# Patient Record
Sex: FEMALE | Race: BLACK OR AFRICAN AMERICAN | NOT HISPANIC OR LATINO
[De-identification: names, ages, dates, MRNs, and addresses within clinical notes are randomized per-mention and may not be internally consistent; named-entity substitution may affect disease eponyms.]

---

## 2017-02-27 PROBLEM — L20.89 OTHER ATOPIC DERMATITIS: Status: ACTIVE | Noted: 2017-02-27

## 2017-03-14 PROBLEM — J06.9 ACUTE UPPER RESPIRATORY INFECTION, UNSPECIFIED: Status: ACTIVE | Noted: 2017-03-14

## 2017-03-14 PROBLEM — J45.909 UNSPECIFIED ASTHMA, UNCOMPLICATED: Status: ACTIVE | Noted: 2017-03-14

## 2017-04-11 PROBLEM — G62.9 POLYNEUROPATHY, UNSPECIFIED: Status: ACTIVE | Noted: 2017-04-11

## 2017-10-31 PROBLEM — R05 COUGH: Status: ACTIVE | Noted: 2017-10-31

## 2017-10-31 PROBLEM — Z23 ENCOUNTER FOR IMMUNIZATION: Status: ACTIVE | Noted: 2017-10-31

## 2017-10-31 PROBLEM — G56.01 CARPAL TUNNEL SYNDROME, RIGHT UPPER LIMB: Status: ACTIVE | Noted: 2017-10-31

## 2017-10-31 PROBLEM — E11.9 TYPE 2 DIABETES MELLITUS WITHOUT COMPLICATIONS: Status: ACTIVE | Noted: 2017-10-31

## 2017-11-06 PROBLEM — M51.26 OTHER INTERVERTEBRAL DISC DISPLACEMENT, LUMBAR REGION: Status: ACTIVE | Noted: 2017-11-06

## 2017-12-29 PROBLEM — R32 UNSPECIFIED URINARY INCONTINENCE: Status: ACTIVE | Noted: 2017-12-29

## 2017-12-29 PROBLEM — R35.0 FREQUENCY OF MICTURITION: Status: ACTIVE | Noted: 2017-12-29

## 2018-02-27 PROBLEM — R63.5 ABNORMAL WEIGHT GAIN: Status: ACTIVE | Noted: 2018-02-27

## 2018-02-27 PROBLEM — G47.33 OBSTRUCTIVE SLEEP APNEA (ADULT) (PEDIATRIC): Status: ACTIVE | Noted: 2018-02-27

## 2018-04-20 PROBLEM — R94.5 ABNORMAL RESULTS OF LIVER FUNCTION STUDIES: Status: ACTIVE | Noted: 2018-04-20

## 2018-04-20 PROBLEM — E78.2 MIXED HYPERLIPIDEMIA: Status: ACTIVE | Noted: 2018-04-20

## 2018-06-27 PROBLEM — M25.561 PAIN IN RIGHT KNEE: Status: ACTIVE | Noted: 2018-06-27

## 2018-08-12 PROBLEM — G47.09 OTHER INSOMNIA: Status: ACTIVE | Noted: 2018-08-12

## 2018-11-09 PROBLEM — S76.212D: Status: ACTIVE | Noted: 2018-11-09

## 2018-11-09 PROBLEM — E66.01 MORBID (SEVERE) OBESITY DUE TO EXCESS CALORIES: Status: ACTIVE | Noted: 2018-11-09

## 2019-02-27 PROBLEM — E53.8 DEFICIENCY OF OTHER SPECIFIED B GROUP VITAMINS: Status: ACTIVE | Noted: 2019-02-27

## 2019-05-22 PROBLEM — Z51.81 ENCOUNTER FOR THERAPEUTIC DRUG LEVEL MONITORING: Status: ACTIVE | Noted: 2019-05-22

## 2019-10-21 PROBLEM — M54.2 CERVICALGIA: Status: ACTIVE | Noted: 2019-10-21

## 2020-03-04 PROBLEM — J18.9 PNEUMONIA, UNSPECIFIED ORGANISM: Status: ACTIVE | Noted: 2020-03-04

## 2020-03-04 PROBLEM — J02.0 STREPTOCOCCAL PHARYNGITIS: Status: ACTIVE | Noted: 2020-03-04

## 2020-03-24 PROBLEM — R22.1 LOCALIZED SWELLING, MASS AND LUMP, NECK: Status: ACTIVE | Noted: 2020-03-24

## 2020-03-24 PROBLEM — E11.9 TYPE 2 DIABETES MELLITUS WITHOUT COMPLICATIONS: Status: ACTIVE | Noted: 2020-03-24

## 2020-06-24 ENCOUNTER — RX ONLY (OUTPATIENT)
Age: 65
Setting detail: RX ONLY
End: 2020-06-24

## 2020-08-25 ENCOUNTER — RX ONLY (OUTPATIENT)
Age: 65
Setting detail: RX ONLY
End: 2020-08-25

## 2020-10-14 ENCOUNTER — RX ONLY (OUTPATIENT)
Age: 65
Setting detail: RX ONLY
End: 2020-10-14

## 2020-12-29 ENCOUNTER — RX ONLY (OUTPATIENT)
Age: 65
Setting detail: RX ONLY
End: 2020-12-29

## 2021-03-22 ENCOUNTER — RX ONLY (OUTPATIENT)
Age: 66
Setting detail: RX ONLY
End: 2021-03-22

## 2021-07-07 ENCOUNTER — APPOINTMENT (RX ONLY)
Dept: URBAN - METROPOLITAN AREA CLINIC 349 | Facility: CLINIC | Age: 66
Setting detail: DERMATOLOGY
End: 2021-07-07

## 2021-07-07 DIAGNOSIS — L28.1 PRURIGO NODULARIS: ICD-10-CM

## 2021-07-07 DIAGNOSIS — L30.4 ERYTHEMA INTERTRIGO: ICD-10-CM

## 2021-07-07 DIAGNOSIS — B35.3 TINEA PEDIS: ICD-10-CM

## 2021-07-07 DIAGNOSIS — L43.8 OTHER LICHEN PLANUS: ICD-10-CM

## 2021-07-07 DIAGNOSIS — L65.9 NONSCARRING HAIR LOSS, UNSPECIFIED: ICD-10-CM

## 2021-07-07 PROCEDURE — 99203 OFFICE O/P NEW LOW 30 MIN: CPT

## 2021-07-07 PROCEDURE — ? PRESCRIPTION

## 2021-07-07 PROCEDURE — ? OTHER

## 2021-07-07 PROCEDURE — ? PRESCRIPTION MEDICATION MANAGEMENT

## 2021-07-07 PROCEDURE — ? COUNSELING

## 2021-07-07 RX ORDER — KETOCONAZOLE 20 MG/G
CREAM TOPICAL
Qty: 1 | Refills: 3 | Status: ERX | COMMUNITY
Start: 2021-07-07

## 2021-07-07 RX ORDER — HALOBETASOL PROPIONATE AND TAZAROTENE .1; .45 MG/G; MG/G
LOTION TOPICAL
Qty: 1 | Refills: 4 | Status: ERX | COMMUNITY
Start: 2021-07-07

## 2021-07-07 RX ADMIN — KETOCONAZOLE: 20 CREAM TOPICAL at 00:00

## 2021-07-07 RX ADMIN — HALOBETASOL PROPIONATE AND TAZAROTENE: .1; .45 LOTION TOPICAL at 00:00

## 2021-07-07 ASSESSMENT — LOCATION DETAILED DESCRIPTION DERM
LOCATION DETAILED: LEFT PROXIMAL PRETIBIAL REGION
LOCATION DETAILED: RIGHT BUTTOCK
LOCATION DETAILED: LEFT DORSAL FOOT
LOCATION DETAILED: LEFT DISTAL POSTERIOR UPPER ARM
LOCATION DETAILED: LEFT DISTAL PRETIBIAL REGION
LOCATION DETAILED: LEFT MEDIAL FRONTAL SCALP
LOCATION DETAILED: RIGHT DISTAL POSTERIOR UPPER ARM
LOCATION DETAILED: LEFT ACHILLES SKIN
LOCATION DETAILED: RIGHT DORSAL FOOT
LOCATION DETAILED: RIGHT DISTAL PRETIBIAL REGION
LOCATION DETAILED: RIGHT INFRAMAMMARY CREASE (INNER QUADRANT)

## 2021-07-07 ASSESSMENT — LOCATION SIMPLE DESCRIPTION DERM
LOCATION SIMPLE: RIGHT BUTTOCK
LOCATION SIMPLE: RIGHT POSTERIOR UPPER ARM
LOCATION SIMPLE: RIGHT BREAST
LOCATION SIMPLE: LEFT POSTERIOR UPPER ARM
LOCATION SIMPLE: RIGHT PRETIBIAL REGION
LOCATION SIMPLE: RIGHT FOOT
LOCATION SIMPLE: LEFT FOOT
LOCATION SIMPLE: LEFT PRETIBIAL REGION
LOCATION SIMPLE: LEFT ACHILLES SKIN
LOCATION SIMPLE: LEFT SCALP

## 2021-07-07 ASSESSMENT — LOCATION ZONE DERM
LOCATION ZONE: TRUNK
LOCATION ZONE: LEG
LOCATION ZONE: FEET
LOCATION ZONE: SCALP
LOCATION ZONE: ARM

## 2021-07-07 NOTE — PROCEDURE: PRESCRIPTION MEDICATION MANAGEMENT
Samples Given: Duobrii
Render In Strict Bullet Format?: No
Initiate Treatment: Ketoconazole cream apply to feet twice daily until resolved
Detail Level: Zone
Initiate Treatment: Ketoconazole cream apply twice daily under breast for two weeks (prescription under tinea pedis)
Initiate Treatment: Duobrii apply once daily to arms and buttocks as needed.

## 2021-07-07 NOTE — HPI: SKIN LESIONS
How Severe Is Your Skin Lesion?: moderate
Have Your Skin Lesions Been Treated?: not been treated
Is This A New Presentation, Or A Follow-Up?: Skin Lesion
Additional History: Patient is here for lichen planus on her arms, legs and butt. She was previously seeing OMAYRA Radford for treatment. She had a biopsy done and it states it was lichen planus.

## 2021-07-07 NOTE — PROCEDURE: OTHER
Render Risk Assessment In Note?: yes
Note Text (......Xxx Chief Complaint.): This diagnosis correlates with the
Detail Level: Zone
Other (Free Text): Not present today
Other (Free Text): Nothing present today. Will request records.

## 2021-07-07 NOTE — HPI: HAIR LOSS (ALOPECIA AREATA)
How Severe Is It?: moderate
Is This A New Presentation, Or A Follow-Up?: Hair Loss
Additional History: Patient states she was getting intralesional injections in her scalp for hairloss

## 2021-07-13 ENCOUNTER — RX ONLY (OUTPATIENT)
Age: 66
Setting detail: RX ONLY
End: 2021-07-13

## 2021-07-13 RX ORDER — FLUTICASONE PROPIONATE 0.05 MG/G
OINTMENT TOPICAL
Qty: 1 | Refills: 1 | Status: ERX | COMMUNITY
Start: 2021-07-13

## 2021-10-13 ENCOUNTER — APPOINTMENT (RX ONLY)
Dept: URBAN - METROPOLITAN AREA CLINIC 349 | Facility: CLINIC | Age: 66
Setting detail: DERMATOLOGY
End: 2021-10-13

## 2021-10-13 ENCOUNTER — RX ONLY (OUTPATIENT)
Age: 66
Setting detail: RX ONLY
End: 2021-10-13

## 2021-10-13 DIAGNOSIS — L43.8 OTHER LICHEN PLANUS: ICD-10-CM

## 2021-10-13 DIAGNOSIS — L30.4 ERYTHEMA INTERTRIGO: ICD-10-CM | Status: RESOLVING

## 2021-10-13 DIAGNOSIS — L28.1 PRURIGO NODULARIS: ICD-10-CM | Status: IMPROVED

## 2021-10-13 DIAGNOSIS — B35.3 TINEA PEDIS: ICD-10-CM | Status: IMPROVED

## 2021-10-13 PROCEDURE — ? PRESCRIPTION MEDICATION MANAGEMENT

## 2021-10-13 PROCEDURE — ? OTHER

## 2021-10-13 PROCEDURE — 99213 OFFICE O/P EST LOW 20 MIN: CPT

## 2021-10-13 PROCEDURE — ? COUNSELING

## 2021-10-13 RX ORDER — FLUTICASONE PROPIONATE 0.05 MG/G
OINTMENT TOPICAL
Qty: 60 | Refills: 5 | Status: ERX

## 2021-10-13 RX ORDER — KETOCONAZOLE 20 MG/G
CREAM TOPICAL
Qty: 60 | Refills: 5 | Status: ERX | COMMUNITY
Start: 2021-10-13

## 2021-10-13 ASSESSMENT — LOCATION DETAILED DESCRIPTION DERM
LOCATION DETAILED: LEFT DORSAL FOOT
LOCATION DETAILED: LEFT ACHILLES SKIN
LOCATION DETAILED: LEFT DISTAL POSTERIOR UPPER ARM
LOCATION DETAILED: RIGHT DORSAL FOOT
LOCATION DETAILED: RIGHT DISTAL POSTERIOR UPPER ARM
LOCATION DETAILED: RIGHT DISTAL PRETIBIAL REGION
LOCATION DETAILED: RIGHT INFRAMAMMARY CREASE (INNER QUADRANT)
LOCATION DETAILED: LEFT PROXIMAL PRETIBIAL REGION
LOCATION DETAILED: RIGHT BUTTOCK
LOCATION DETAILED: LEFT DISTAL PRETIBIAL REGION

## 2021-10-13 ASSESSMENT — LOCATION SIMPLE DESCRIPTION DERM
LOCATION SIMPLE: LEFT FOOT
LOCATION SIMPLE: LEFT PRETIBIAL REGION
LOCATION SIMPLE: LEFT ACHILLES SKIN
LOCATION SIMPLE: RIGHT PRETIBIAL REGION
LOCATION SIMPLE: RIGHT FOOT
LOCATION SIMPLE: RIGHT BUTTOCK
LOCATION SIMPLE: RIGHT POSTERIOR UPPER ARM
LOCATION SIMPLE: RIGHT BREAST
LOCATION SIMPLE: LEFT POSTERIOR UPPER ARM

## 2021-10-13 ASSESSMENT — LOCATION ZONE DERM
LOCATION ZONE: FEET
LOCATION ZONE: LEG
LOCATION ZONE: ARM
LOCATION ZONE: TRUNK

## 2021-10-13 NOTE — PROCEDURE: PRESCRIPTION MEDICATION MANAGEMENT
Continue Regimen: Ketoconazole cream apply to feet twice daily until resolved.
Render In Strict Bullet Format?: No
Detail Level: Zone
Continue Regimen: Fluticasone oint apply twice daily for two weeks

## 2021-10-13 NOTE — PROCEDURE: OTHER
Note Text (......Xxx Chief Complaint.): This diagnosis correlates with the
Other (Free Text): Patient states she isn’t consistent with applying the medication. Advised patient to call us if she develops a new lesion.
Detail Level: Zone
Other (Free Text): Patient states she has a hard time getting to the bathroom at night and often urinates on her feet. She states the cream is helping but the rash is most likely a result from the incidents
Render Risk Assessment In Note?: yes

## 2023-01-30 ENCOUNTER — PREP FOR PROCEDURE (OUTPATIENT)
Dept: ADMINISTRATIVE | Age: 68
End: 2023-01-30

## 2023-02-15 RX ORDER — SODIUM CHLORIDE 9 MG/ML
INJECTION, SOLUTION INTRAVENOUS PRN
Status: CANCELLED | OUTPATIENT
Start: 2023-02-15

## 2023-02-15 RX ORDER — SODIUM CHLORIDE 0.9 % (FLUSH) 0.9 %
5-40 SYRINGE (ML) INJECTION EVERY 12 HOURS SCHEDULED
Status: CANCELLED | OUTPATIENT
Start: 2023-02-15

## 2023-02-15 RX ORDER — SODIUM CHLORIDE 0.9 % (FLUSH) 0.9 %
5-40 SYRINGE (ML) INJECTION PRN
Status: CANCELLED | OUTPATIENT
Start: 2023-02-15

## 2023-02-17 RX ORDER — OMEPRAZOLE 20 MG/1
CAPSULE, DELAYED RELEASE ORAL DAILY
COMMUNITY

## 2023-02-17 RX ORDER — TRAMADOL HYDROCHLORIDE 50 MG/1
50 TABLET ORAL EVERY 6 HOURS PRN
COMMUNITY

## 2023-02-17 RX ORDER — DOXEPIN HYDROCHLORIDE 10 MG/1
10 CAPSULE ORAL NIGHTLY
COMMUNITY

## 2023-02-17 RX ORDER — METOPROLOL SUCCINATE 25 MG/1
25 TABLET, EXTENDED RELEASE ORAL DAILY
COMMUNITY

## 2023-02-17 RX ORDER — HYDROCHLOROTHIAZIDE 25 MG/1
25 TABLET ORAL DAILY
COMMUNITY

## 2023-02-17 RX ORDER — DULAGLUTIDE 4.5 MG/.5ML
4.5 INJECTION, SOLUTION SUBCUTANEOUS WEEKLY
COMMUNITY
Start: 2022-09-01

## 2023-02-17 RX ORDER — TELMISARTAN 40 MG/1
40 TABLET ORAL DAILY
COMMUNITY

## 2023-02-17 NOTE — PERIOP NOTE
Patient verified name, , and procedure. Type: 1a; abbreviated assessment per anesthesia guidelines    Labs per anesthesia: POC glucose DOS    Instructed pt that they will be notified the day before their procedure by the GI Lab for time of arrival if their procedure is [facility] and Pre-op for [place] cases. Arrival times should be called by 5 pm. If no phone is received the patient should contact their respective hospital. The GI lab telephone number is [phone] and ES Pre-op is [phone]. Follow diet and prep instructions per office including NPO status. If patient has NOT received instructions from office patient is advised to call surgeon office, verbalizes understanding. Bath or shower the night before and the am of surgery with non-moisturizing soap. No lotions, oils, powders, cologne on skin. No make up, eye make up or jewelry. Wear loose fitting comfortable, clean clothing. Must have adult present in building the entire time . Medications for the day of procedure metoprolol, omeprazole. May take tramadol if needed, patient to hold : Do not take telmisartan or HCTZ on the morning of procedure per anesthesia guidelines. The following discharge instructions reviewed with patient: medication given during procedure may cause drowsiness for several hours, therefore, do not drive or operate machinery for remainder of the day. You may not drink alcohol on the day of your procedure, please resume regular diet and activity unless otherwise directed. You may experience abdominal distention for several hours that is relieved by the passage of gas. Contact your physician if you have any of the following: fever or chills, severe abdominal pain or excessive amount of bleeding or a large amount when having a bowel movement.  Occasional specks of blood with bowel movement would not be unusual.

## 2023-02-22 NOTE — PERIOP NOTE
Return call from message on PAT line. Patient stated she takes trulicity on Wednesday. Patient instructed it was ok to take the Trulicity today and if  you feel symptoms of low blood sugar while fasting Friday morning, check level. If low, drink only 4 ounces of a clear, sugary liquid and call pre-op at [phone]. (Anesthesia protocol). Patient verbalized understanding.

## 2023-02-23 NOTE — H&P
Preoperative H&P    Patient is followed by Dr. Suleiman Orozco. See his clinic note below for full details. She is here today for an EGD (for evaluation of gastric polyp with LGD in 2011) and Surveillance Colonoscopy (for evaluation of history of colon polyps). BP (!) 184/91   Pulse (!) 48   Temp 98.8 °F (37.1 °C) (Oral)   Resp 16   Ht 5' 5\" (1.651 m)   Wt 262 lb (118.8 kg)   SpO2 100%   BMI 43.60 kg/m²   GEN: Lying in bed in NAD  RESP: comfortable on room air  CV: RRR  ABD: soft, NT, ND, obese  NEURO: awake and interactive  PSYCH: normal mood    Endoscopy and risks explained to the patient. Risks including reaction to sedation, cardiopulmonary events, infection, bleeding, perforation, requirement for surgery if complications should occur, death were explained to patient who expressed understanding and agreed to proceed with endoscopy. Sergey Morrell MD   [facility]                          Patient:   Stanley Chamberlain  YOB: 1955   Date:                        01/30/2023 9:30 AM   Visit Type:                  Consult  Provider:   Umesh Hernandez MD  Referring Provider:  Kristina Suero MD [address] [place] [address]  Primary Care Provider: Kristina Suero MD [address] [place] [address]    This 79year old  patient was referred by Kristina Suero MD.  This 79year old female presents for elevated LFTs and abnormal liver ultrasound. History of Present Illness:  1.  elevated LFTs, abnormal liver ultrasound   Ms. Carmen Faria is a 79year old female who returns to the office today for evaluation of elevated LFTs and abnormal ultrasound, referred by Dr. Kristina Suero. She was last seen in our office by TIFFANIE Pizarro in March 2019 for history of colon polyps, family history of colon cancer in sister, gastroesophageal reflux disease and history of gastric polyps.   EGD and Colonoscopy were performed by me at [facility] on 4/15/19: small HH, mild gastritis (bxs c/w chronic gastritis and prominent reactive gastropathy), two small gastric polyps; small IH, 3mm hyperplastic polyp in the rectum. A 3 year recall for EGD and Colonoscopy were recommended. She was previously seen by me in 2016 at which time she was treated with Ardath Delay for hepatitis-C, genotype 1a with F2 Fibrosis with incomplete clearance and relapsed after stopping treatment. Weight loss to reduce fatty liver and follow-up to discuss re-treatment was recommended but patient was lost to follow-up. I have reviewed Dr. Arcadio Draper' office note dated 11/25/22. Labs obtained on 9/23/22 included CBC and CMP notable for glucose 127 (H), AST 50 (H), ALT 37 (H). Prior CMP on 9/1/22 with AST 48 (H), ALT 36 (H). Abdominal sonogram on 10/21/22 showed mild diffuse coarsening of hepatic echotexture similar to prior exam.    Patient is accompanied by her  for today's visit. Her weight is down 78lbs since last visit, per our scales. She states that she has recently completed breast cancer treatment with Dr. Glo Damon; states she was in the middle of treatment at the time of her EGD and Colonoscopy recall that she received last year. She reports that her bowel habits currently tend towards diarrhea. She notes some rectal soreness when she wipes but denies any rectal bleeding. She denies any abdominal pain, nausea, vomiting, jaundice, pruritus or changes in the color of urine or stools. Patient denies any other significant interval changes in her health history or on review of systems. She complains of persistent back pain. She reports that she is followed by Dermatology for  Lichen planus. She states that her sugars are well-controlled since she has been treated with Trulicity. Records in [facility] were reviewed to include Dr. Pham Laura note dated 1/27/23.   She was diagnosed with grade 2, stage 1B moderately differentiated invasive ductal carcinoma in the left breast - inner lower quadrant - lumpectomy by Dr. Chandler Arora on 11/8/21 with negative sentinel nodes - triple negative treated with 4 cycles of Taxotere and cyclophosphamide followed by radiation therapy without any evidence of recurrence. Labs on 1/27/23 showed normal CBC with platelet count of 825 and unremarkable CMP except for elevated AST of 55 and high normal ALT of 51 with albumin of 3.0. Past Medical/Surgical History:   (Detailed)  Disease/disorder Onset Date Management Date Comments     multiple orthopedic surgeries     chr hep c genotype 1a  S/P Harvoni for 12 weeks with relapse     lichen planus       Diabetes mellitus       Hypertension       sleep apnea       GERD       allergies       hyperchloesterolemia       morbid obesity         1973 colostomy after perforation from  IUD. Misael Le colostomy later reversed       hysterectomy /BSO       Tonsillectomy       Additional Medical History  Hepatitis C Genotype 1a. Liver biopsy 2003- stage I, grade 2. F2-F4 FibroSpect 9/10/15; Harvoni therapy for 12 weeks starting 3/28/16 with response but relapse after conclusion of therapy. F2 by FibroSure 7/28/16;   10/12/16 HCV GenoSure NS3/4a No VICKY -No Q80K. NS5a Multiple VICKY detected with possible resistance to DCV, EBR, LDV, OBV and LISA   Lichen planus requiring Cellcept  Diabetes mellitus insulin-dependent, controlled  Hypertension  Sleep apnea  GERD - controlled  Allergies  Hypercholesterolemia  Morbid Obesity  US 2007- adenomyosis of gallbladder, HM, [place]. Echocardiogram & nuclear stress test negative 2007  Breast cancer    Additional Surgical History  1973 colostomy after perforation from IUD. Colostomy later reversed.   Hysterectomy/BSO  Right lumpectomy  Left lumpectomy for breast cancer Dr. Chandler Arora 11/2021  Multiple orthopedic surgeries    Procedure History  2002 EGD- hiatal hernia, mojgan positive gastritis  2004 colonoscopy- hemorrhoids  EGD 8/29/11- gastric polyp with low grade glandular dysplasia with intestinal metaplasia (not completely removed), [facility]  [place] [facility]- small internal hemorrhoids, adenomatous and hyperplastic polyps. 3 year recall. EGD 11 - with polypectomy, hp gastric polyp with int metaplasia and focal low grade dysplasia  EGD and Colon 5/26/15 hiatal hernia, gastritis, gastric polyp, colon polyps( TA and Hp), int. hemorrhoids  EGD/colonoscopy 4/15/19: small HH, mild gastritis (bxs c/w chronic gastritis and prominent reactive gastropathy), two small gastric polyps (non-neoplastic); small IH, 3mm hyperplastic polyp in the rectum. Family History:  (Detailed)  Relationship Family Member Name  Age at Death Condition Onset Age Cause of Death       No family history of Colon polyps  N   Brother  Y  Liver cancer  Y   Brother  Y    N   Father  Y  MI  Y   Father  Y    N   Mother  Y    N   Mother  Y  MI  Y   Sister  Y    N   Sister  N  Cancer, colon 40 N   Sister  Y  Uterine cancer  Y   Sister  Y  Ovarian cancer  Y     Additional Family History  Sister with pancreatic cancer. Social History:  (Detailed)  Previously worked for [employer]. Now disabled. Tobacco use reviewed. Preferred language is [place]. Education/Employment/Occupation:  Employment History Status Retired Restrictions   [employer]  disabled     MARITAL STATUS/FAMILY/SOCIAL SUPPORT  Currently legally . CHILDREN  Does not have children. Tobacco use status: Never smoked tobacco.  Smoking status: Never smoker. SMOKING STATUS  Type Smoking Status Usage Per Day Years Used Total Pack Years    Never smoker        ALCOHOL  There is a history of alcohol use, but no current usage. consumed occasionally. Last alcoholic drink was recently. CAFFEINE  The patient uses caffeine: soda and coffee. - 1 cup a day. HOME ENVIRONMENT/SAFETY  The patient has fallen 1 times in the last year. COMMENTS  No history of drug abuse or tattoos. Has piercings.     Current Medications:  Medication Generic Name Directions   Calcium 600 + D(3) 600 mg-10 mcg (400 unit) tablet calcium carbonate/vitamin D3    doxepin 10 mg capsule doxepin HCl take 1 capsule by oral route  every day   hydrochlorothiazide 25 mg tablet hydrochlorothiazide take 1 tablet by oral route  every day   meloxicam 15 mg tablet meloxicam take 1 tablet by oral route  every day   metoprolol succinate ER 25 mg 24 hr Tab METOPROLOL SUCCINATE take 1 tablet (25MG)  by oral route  every day   Novolog Flexpen 100 unit/mL subcutaneous insulin aspart inject by subcutaneous route per prescriber's instructions. Insulin dosing requires individualization. omeprazole 20 mg capsule,delayed release omeprazole take 1 capsule by oral route  every day before a meal as needed   oxybutynin chloride 5 mg tablet oxybutynin chloride take 1 tablet by oral route  every day   telmisartan 40 mg tablet telmisartan take 1 tablet by oral route  every day   tramadol 50 mg tablet tramadol HCl take 1 tablet by oral route  every 6 hours as needed   Trulicity 4.5 VF/0.4 mL subcutaneous pen injector dulaglutide inject (4.5MG)  by subcutaneous route  every week     Allergies:  Ingredient Reaction (Severity) Medication Name Comment   NO KNOWN ALLERGIES      Reviewed, no changes. Review of Systems:  System Neg/Pos Details   ENMT Positive Sleep apnea. GI Positive Constipation, Diarrhea.  Positive Urinary frequency. Psych Positive Anxiety. MS Positive Joint pain, Back pain. An 11-point review of systems was negative except as mentioned in the HPI and Past Medical History. Vital Signs:  BP mm/Hg Pulse Resp Pulse Ox Temp F Ht (Total in.) Weight (lbs.) Weight (oz.) BMI   162/90 77 16 94 97.60 64.00 272.00  46.69     Physical Exam:  CONST:  NAD. Morbidly obese, appears stated age. In wheelchair. EYES: Conjunctiva pink. Sclerae non-icteric. EARS: Hearing grossly normal.   NECK: Symmetrical with no obvious masses. No JVD. RESP:  Normal respiratory effort. Normal to auscultation. CVS: RRR with no g /m/ r. No Carotid bruits. No edema noted.   GI: Symmetrical, non-distended abdomen. BS WNL. No masses. No tenderness. No g/r. SKIN: No significant petechiae, bruises or spider angiomas. MSK: Normal movements of extremities. NEURO: Oriented to person, place, and general circumstances. Recent and remote memory grossly intact. Able to give personal history. PSYCH: Mood and affect appropriate. Judgement is intact. Assessment/Plan:  # Detail Type Description    1. Assessment Chronic hepatitis C (B18.2). Provider Plan S/P treatment with Rahman Sovereign for hepatitis-C in 2016, for genotype 1a with F2 Fibrosis with incomplete clearance and relapse after stopping treatment. She has had mild elevations of LFTs. We will recheck HCV labs today as per orders. She will call to check on results and recommendations. Possible retreatment based on lab results and clinical course. Plan Orders Fibrosure (HCV) to be performed, HCV RNA by PCR, Qn Rfx Ania to be performed, Hep A Total (IgG/IgM) to be performed, Hep B Core Ab Total to be performed, Hep B Surface Ab to be performed, Hep B Surface Ag to be performed, HIV to be performed and PT W/ INR to be performed. Further evaluations ordered today include [place] W/WO Cytology [address] to be performed, Next Lab Date is on and EGD W/WO Cytology to be performed on, Next Lab Date is 02/24/2023.         2. Assessment Elevated LFTs (R79.89). Provider Plan Labs obtained on 9/23/22 included CBC and CMP notable for glucose 127 (H), AST 50 (H), ALT 37 (H). Prior CMP on 9/1/22 with AST 48 (H), ALT 36 (H). Labs on 1/27/23 showed normal CBC with platelet count of 095 and unremarkable CMP except for elevated AST of 55 and high normal ALT of 51 with albumin of 3.0.         3. Assessment Abnormal ultrasound of liver (R93.2). Provider Plan Abdominal sonogram on 10/21/22 showed mild diffuse coarsening of hepatic echotexture similar to prior exam.  This may represent liver fibrosis due to hepatitis or fatty liver.          4. Assessment Fatty liver (K76.0). Provider Plan She was again encouraged to adopt a healthy diet and exercise with the goal of long term, sustained weight loss. She will continue follow up with her PCP to optimize treatment of her diabetes. 5. Assessment Gastroesophageal reflux disease without esophagitis (K21.9). Provider Plan Dietary and lifestyle measures for management of reflux disease were also reviewed. Omeprazole will be continued. 6. Assessment History of gastric polyp (Z87.19). Provider Plan She has a history of gastric polyp with low-grade dysplasia removed in 2011 with negative followup in 2015 and small gastric polyps removed in 2019 showing changes of reactive gastropathy only. An EGD is recommended. The risks of the procedure were discussed with the patient including bleeding, perforation, missed pathology, and complications of anesthesia. Instructions were provided and reviewed. ASA 3.         7. Assessment Hx of adenomatous colonic polyps (Z86.010). Provider Plan Status post colonoscopy 5/26/15 with removal of multiple adenomas. Follow-up colonoscopy in 2019 negative except for rectal polyp which only showed superficial hyperplastic changes. A 3 year follow-up was recommended. Plan procedure includes colonoscopy in hospital setting to rule out colonic polyps or other pathology. The risk of the procedure was discussed with the patient including but not limited to anesthesia complications, infection, bleeding, and perforation. Patient verbalizes understanding and wishes to proceed with colonoscopy. Any modifications to patient's medications were discussed at today's visit. Patient voiced understanding. ASA 3. Fall Risk Plan:  The patient has fallen 1 times in the last year.      Counseling / Educational Factors:  Items reviewed / discussed during today's visit: prior testing / procedures were reviewed; old records were reviewed; indications and risks of the procedure were discussed; prescription medication usage was discussed; symptomatic care was discussed; advised to continue current treatment; patient instructed to call for results of labs; dietary instructions were given for low carbohydrate diet. Patient expressed understanding of instructions. Other advice: complete abstinence from alcohol and other possible causes of liver injury. I personally performed the services described in this documentation. Nela Bill (Scribe) assisted in my presence with documentation, which I have reviewed and validated. Provider:   Patrick Briceño 02/02/2023 8:29 PM   Document generated by: Taj Banks MD 02/02/2023     Providers:  Jonathan Cade MD, MD  [facility], [address]-    ___________________________________________________________________________________________________________________________    Electronically signed by Alessandro James.  Cam Banks MD on 02/02/2023 08:33 PM

## 2023-02-23 NOTE — PROGRESS NOTES
RN called Pt to confirm appointment time of 0940, arrival time 0810, location,  requirement, and instructions for registration at the hospital.  Pt verbalized understanding.

## 2023-02-24 ENCOUNTER — HOSPITAL ENCOUNTER (OUTPATIENT)
Age: 68
Setting detail: OUTPATIENT SURGERY
Discharge: HOME | End: 2023-02-24
Attending: INTERNAL MEDICINE | Admitting: INTERNAL MEDICINE
Payer: MEDICARE

## 2023-02-24 ENCOUNTER — ANESTHESIA EVENT (OUTPATIENT)
Dept: ENDOSCOPY | Age: 68
End: 2023-02-24
Payer: MEDICARE

## 2023-02-24 ENCOUNTER — ANESTHESIA (OUTPATIENT)
Dept: ENDOSCOPY | Age: 68
End: 2023-02-24
Payer: MEDICARE

## 2023-02-24 VITALS
HEART RATE: 86 BPM | WEIGHT: 262 LBS | HEIGHT: 65 IN | BODY MASS INDEX: 43.65 KG/M2 | SYSTOLIC BLOOD PRESSURE: 178 MMHG | TEMPERATURE: 96.8 F | DIASTOLIC BLOOD PRESSURE: 84 MMHG | RESPIRATION RATE: 14 BRPM | OXYGEN SATURATION: 97 %

## 2023-02-24 LAB
GLUCOSE BLD STRIP.AUTO-MCNC: 136 MG/DL (ref 65–100)
SERVICE CMNT-IMP: ABNORMAL

## 2023-02-24 PROCEDURE — A4216 STERILE WATER/SALINE, 10 ML: HCPCS | Performed by: ANESTHESIOLOGY

## 2023-02-24 PROCEDURE — 2709999900 HC NON-CHARGEABLE SUPPLY: Performed by: INTERNAL MEDICINE

## 2023-02-24 PROCEDURE — 3700000000 HC ANESTHESIA ATTENDED CARE: Performed by: INTERNAL MEDICINE

## 2023-02-24 PROCEDURE — 7100000011 HC PHASE II RECOVERY - ADDTL 15 MIN: Performed by: INTERNAL MEDICINE

## 2023-02-24 PROCEDURE — 88312 SPECIAL STAINS GROUP 1: CPT

## 2023-02-24 PROCEDURE — 6360000002 HC RX W HCPCS: Performed by: NURSE ANESTHETIST, CERTIFIED REGISTERED

## 2023-02-24 PROCEDURE — 88305 TISSUE EXAM BY PATHOLOGIST: CPT

## 2023-02-24 PROCEDURE — 3609012400 HC EGD TRANSORAL BIOPSY SINGLE/MULTIPLE: Performed by: INTERNAL MEDICINE

## 2023-02-24 PROCEDURE — 2580000003 HC RX 258: Performed by: ANESTHESIOLOGY

## 2023-02-24 PROCEDURE — 82962 GLUCOSE BLOOD TEST: CPT

## 2023-02-24 PROCEDURE — 2500000003 HC RX 250 WO HCPCS: Performed by: NURSE ANESTHETIST, CERTIFIED REGISTERED

## 2023-02-24 PROCEDURE — 7100000010 HC PHASE II RECOVERY - FIRST 15 MIN: Performed by: INTERNAL MEDICINE

## 2023-02-24 PROCEDURE — 2500000003 HC RX 250 WO HCPCS: Performed by: ANESTHESIOLOGY

## 2023-02-24 PROCEDURE — 3700000001 HC ADD 15 MINUTES (ANESTHESIA): Performed by: INTERNAL MEDICINE

## 2023-02-24 PROCEDURE — 3609010600 HC COLONOSCOPY POLYPECTOMY SNARE/COLD BIOPSY: Performed by: INTERNAL MEDICINE

## 2023-02-24 RX ORDER — SODIUM CHLORIDE 9 MG/ML
INJECTION, SOLUTION INTRAVENOUS PRN
Status: CANCELLED | OUTPATIENT
Start: 2023-02-24

## 2023-02-24 RX ORDER — SODIUM CHLORIDE, SODIUM LACTATE, POTASSIUM CHLORIDE, CALCIUM CHLORIDE 600; 310; 30; 20 MG/100ML; MG/100ML; MG/100ML; MG/100ML
INJECTION, SOLUTION INTRAVENOUS CONTINUOUS
Status: DISCONTINUED | OUTPATIENT
Start: 2023-02-24 | End: 2023-02-24 | Stop reason: HOSPADM

## 2023-02-24 RX ORDER — SODIUM CHLORIDE 0.9 % (FLUSH) 0.9 %
5-40 SYRINGE (ML) INJECTION PRN
Status: DISCONTINUED | OUTPATIENT
Start: 2023-02-24 | End: 2023-02-24 | Stop reason: HOSPADM

## 2023-02-24 RX ORDER — SODIUM CHLORIDE 0.9 % (FLUSH) 0.9 %
5-40 SYRINGE (ML) INJECTION PRN
Status: CANCELLED | OUTPATIENT
Start: 2023-02-24

## 2023-02-24 RX ORDER — ONDANSETRON 2 MG/ML
4 INJECTION INTRAMUSCULAR; INTRAVENOUS
Status: CANCELLED | OUTPATIENT
Start: 2023-02-24 | End: 2023-02-25

## 2023-02-24 RX ORDER — SODIUM CHLORIDE 9 MG/ML
INJECTION, SOLUTION INTRAVENOUS PRN
Status: DISCONTINUED | OUTPATIENT
Start: 2023-02-24 | End: 2023-02-24 | Stop reason: HOSPADM

## 2023-02-24 RX ORDER — GLYCOPYRROLATE 0.2 MG/ML
INJECTION INTRAMUSCULAR; INTRAVENOUS PRN
Status: DISCONTINUED | OUTPATIENT
Start: 2023-02-24 | End: 2023-02-24 | Stop reason: SDUPTHER

## 2023-02-24 RX ORDER — SODIUM CHLORIDE 0.9 % (FLUSH) 0.9 %
5-40 SYRINGE (ML) INJECTION EVERY 12 HOURS SCHEDULED
Status: CANCELLED | OUTPATIENT
Start: 2023-02-24

## 2023-02-24 RX ORDER — SODIUM CHLORIDE 0.9 % (FLUSH) 0.9 %
5-40 SYRINGE (ML) INJECTION EVERY 12 HOURS SCHEDULED
Status: DISCONTINUED | OUTPATIENT
Start: 2023-02-24 | End: 2023-02-24 | Stop reason: HOSPADM

## 2023-02-24 RX ORDER — LIDOCAINE HYDROCHLORIDE 20 MG/ML
INJECTION, SOLUTION EPIDURAL; INFILTRATION; INTRACAUDAL; PERINEURAL PRN
Status: DISCONTINUED | OUTPATIENT
Start: 2023-02-24 | End: 2023-02-24 | Stop reason: SDUPTHER

## 2023-02-24 RX ORDER — LIDOCAINE HYDROCHLORIDE 10 MG/ML
1 INJECTION, SOLUTION INFILTRATION; PERINEURAL
Status: DISCONTINUED | OUTPATIENT
Start: 2023-02-24 | End: 2023-02-24 | Stop reason: HOSPADM

## 2023-02-24 RX ORDER — PROPOFOL 10 MG/ML
INJECTION, EMULSION INTRAVENOUS PRN
Status: DISCONTINUED | OUTPATIENT
Start: 2023-02-24 | End: 2023-02-24 | Stop reason: SDUPTHER

## 2023-02-24 RX ADMIN — GLYCOPYRROLATE 0.2 MG: 0.2 INJECTION INTRAMUSCULAR; INTRAVENOUS at 10:01

## 2023-02-24 RX ADMIN — FAMOTIDINE 20 MG: 10 INJECTION, SOLUTION INTRAVENOUS at 09:25

## 2023-02-24 RX ADMIN — PROPOFOL 50 MG: 10 INJECTION, EMULSION INTRAVENOUS at 10:02

## 2023-02-24 RX ADMIN — PROPOFOL 100 MG: 10 INJECTION, EMULSION INTRAVENOUS at 10:01

## 2023-02-24 RX ADMIN — SODIUM CHLORIDE, SODIUM LACTATE, POTASSIUM CHLORIDE, AND CALCIUM CHLORIDE: 600; 310; 30; 20 INJECTION, SOLUTION INTRAVENOUS at 09:52

## 2023-02-24 RX ADMIN — LIDOCAINE HYDROCHLORIDE 100 MG: 20 INJECTION, SOLUTION EPIDURAL; INFILTRATION; INTRACAUDAL; PERINEURAL at 10:01

## 2023-02-24 RX ADMIN — PROPOFOL 150 MCG/KG/MIN: 10 INJECTION, EMULSION INTRAVENOUS at 10:03

## 2023-02-24 ASSESSMENT — PAIN - FUNCTIONAL ASSESSMENT: PAIN_FUNCTIONAL_ASSESSMENT: NONE - DENIES PAIN

## 2023-02-24 NOTE — OP NOTE
COLONOSCOPY      DATE of PROCEDURE: 2/24/2023    PT NAME: Bee Hayes  [id number]    PHYSICIAN:  Jayna Ortiz MD    MEDICATION: MAC  INSTRUMENT:CFHQ 190 L  SPECIAL PROCEDURE: Colonoscopy with polypectomy with cold forceps, Colonoscopy surveillance  INDICATIONS: Surveillance, History of polyps  BLOOD LOSS: None  SPECIMENS: Transverse colon polyp    PROCEDURE: After obtaining informed consent, the patient was placed in the left lateral position and sedated. A digital rectal exam was performed. The colonoscope was inserted into the rectum and passed under direct vision to the cecum where the ileocecal valve and appendiceal orifice were identified. The colonoscope was withdrawn with careful evaluation of the mucosa. Retroflexion was performed in the rectum. The prep was good. The patient was taken to the recovery area in stable condition. FINDINGS:  ANUS:   Anal exam reveals no masses or hemorrhoids, sphincter tone is normal.  RECTUM: Rectal exam including retroflexion of the rectum reveals no masses or hemorrhoids. SIGMOID COLON: The mucosa is normal with good vascular pattern and without ulcers, diverticula or polyps. DESCENDING COLON: The mucosa is normal with good vascular pattern and without ulcers or diverticula. SPLENIC FLEXURE: The splenic flexure is normal.  TRANSVERSE COLON: The mucosa is normal with good vascular pattern and without ulcers. A diminutive polyp was removed using cold forceps and sent for histology. HEPATIC FLEXURE: The hepatic flexure is normal.  ASCENDING COLON: The mucosa is normal with good vascular pattern and without ulcers, diverticula or polyps. CECUM: The appendiceal orifice appears normal. The ileocecal valve appears normal.    ASSESSMENT:  1. Transverse colon polyp     PLAN:  1. Follow up pathology  2. If adenoma, will plan on repeating colonoscopy in 5 years  3.  Clinic follow up with Dr. Nato Feng in 2-3 months    Jayna Ortiz MD  [facility]

## 2023-02-24 NOTE — ANESTHESIA PRE PROCEDURE
Department of Anesthesiology  Preprocedure Note       Name:  Eligio Sewell   Age:  79 y.o.  :  1955                                          MRN:  [id number]         Date:  2023      Surgeon: Ofe Camara):  Santiago Cunningham MD    Procedure: Procedure(s):  EGD ESOPHAGOGASTRODUODENOSCOPY  COLORECTAL CANCER SCREENING, NOT HIGH RISK/47    Medications prior to admission:   Prior to Admission medications    Medication Sig Start Date End Date Taking? Authorizing Provider   doxepin (SINEQUAN) 10 MG capsule Take 10 mg by mouth nightly   Yes Historical Provider, MD   telmisartan (MICARDIS) 40 MG tablet Take 40 mg by mouth daily   Yes Historical Provider, MD   metoprolol succinate (TOPROL XL) 25 MG extended release tablet Take 25 mg by mouth daily   Yes Historical Provider, MD   hydroCHLOROthiazide (HYDRODIURIL) 25 MG tablet Take 25 mg by mouth daily   Yes Historical Provider, MD   omeprazole (PRILOSEC) 20 MG delayed release capsule Take by mouth daily   Yes Historical Provider, MD   traMADol (ULTRAM) 50 MG tablet Take 50 mg by mouth every 6 hours as needed for Pain.    Yes Historical Provider, MD   Dulaglutide (TRULICITY) 4.5 NN/6.8IE SOPN Inject 4.5 mg into the skin once a week Takes on 22  Yes Historical Provider, MD       Current medications:    Current Facility-Administered Medications   Medication Dose Route Frequency Provider Last Rate Last Admin    lidocaine 1 % injection 1 mL  1 mL IntraDERmal Once PRN Jocelyn Barger MD        famotidine (PEPCID) 20 mg in sodium chloride (PF) 0.9 % 10 mL injection  20 mg IntraVENous Once Jocelyn Barger MD        lactated ringers IV soln infusion   IntraVENous Continuous Jocelyn Barger MD        sodium chloride flush 0.9 % injection 5-40 mL  5-40 mL IntraVENous 2 times per day Jocelyn Barger MD        sodium chloride flush 0.9 % injection 5-40 mL  5-40 mL IntraVENous PRN Jocelyn Barger MD        0.9 % sodium chloride infusion   IntraVENous PRN Bernice Emanuel MD           Allergies:  No Known Allergies    Problem List:  There is no problem list on file for this patient. Past Medical History:        Diagnosis Date    Acid reflux     Diabetes mellitus (HCC)     Trulicity- avg fasting [phone]; no hypo symptoms    Hiatal hernia     History of breast cancer     History of cardiac murmur     patient states was told had a \"small murmur many years ago\". States has not been heard since. Per last PCP note 11/25/22, heart sounds normal.    History of hepatitis C     History of staph infection     History of stomach ulcers     Hypertension     Sleep apnea     does not use a CPAP       Past Surgical History:        Procedure Laterality Date    BREAST LUMPECTOMY      CARPAL TUNNEL RELEASE      COLONOSCOPY      COLOSTOMY      temporary for 10 weeks at age 14 d/t IUD infection    CYST REMOVAL      from ovaries    ESOPHAGOGASTRODUODENOSCOPY      HYSTERECTOMY (CERVIX STATUS UNKNOWN)      LIVER BIOPSY  2003    LYMPH NODE BIOPSY  11/08/2021    OVARY REMOVAL      PORT SURGERY  11/18/2021    TONSILLECTOMY         Social History:    Social History     Tobacco Use    Smoking status: Never    Smokeless tobacco: Never   Substance Use Topics    Alcohol use: Not Currently                                Counseling given: Not Answered      Vital Signs (Current):   Vitals:    02/17/23 1202 02/24/23 0844   BP:  (!) 184/91   Pulse:  (!) 48   Resp:  16   Temp:  98.8 °F (37.1 °C)   TempSrc:  Oral   SpO2:  100%   Weight: 262 lb (118.8 kg) 262 lb (118.8 kg)   Height: 5' 5\" (1.651 m) 5' 5\" (1.651 m)                                              BP Readings from Last 3 Encounters:   02/24/23 (!) 184/91       NPO Status: Time of last liquid consumption: 2300                        Time of last solid consumption: 2100                        Date of last liquid consumption: 02/23/23                        Date of last solid food consumption: 02/22/23    BMI:   Wt Readings from Last 3 Encounters:   02/24/23 262 lb (118.8 kg)     Body mass index is 43.6 kg/m². CBC: No results found for: WBC, RBC, HGB, HCT, MCV, RDW, PLT    CMP: No results found for: NA, K, CL, CO2, BUN, CREATININE, GFRAA, AGRATIO, LABGLOM, GLUCOSE, GLU, PROT, CALCIUM, BILITOT, ALKPHOS, AST, ALT    POC Tests: No results for input(s): POCGLU, POCNA, POCK, POCCL, POCBUN, POCHEMO, POCHCT in the last 72 hours. Coags: No results found for: PROTIME, INR, APTT    HCG (If Applicable): No results found for: PREGTESTUR, PREGSERUM, HCG, HCGQUANT     ABGs: No results found for: PHART, PO2ART, YCO9DZN, ZBB5IUX, BEART, J9XJNCZE     Type & Screen (If Applicable):  No results found for: LABABO, LABRH    Drug/Infectious Status (If Applicable):  No results found for: HIV, HEPCAB    COVID-19 Screening (If Applicable): No results found for: COVID19        Anesthesia Evaluation  Patient summary reviewed no history of anesthetic complications:   Airway: Mallampati: II  TM distance: >3 FB   Neck ROM: full  Mouth opening: > = 3 FB   Dental: normal exam         Pulmonary: breath sounds clear to auscultation  (+) sleep apnea:                             Cardiovascular:  Exercise tolerance: good (>4 METS),   (+) hypertension:, valvular problems/murmurs (hx heart murmur; no ECHO or treatement):, murmur: Grade 2, Aortic,         Rhythm: regular  Rate: normal                    Neuro/Psych:                ROS comment: HNP x 3 GI/Hepatic/Renal:   (+) hiatal hernia, GERD:, PUD, hepatitis: C, morbid obesity         ROS comment: Pt had IUD erosion age 14 that resulted in hyst with colostomy and transfusion. Treated for hepatitis, unsuccessfully; to be treated again. Endo/Other:    (+) DiabetesType II DM, well controlled, , malignancy/cancer (breast; s/p surgery, radiation and chemo). Abdominal:             Vascular: negative vascular ROS.          Other Findings:           Anesthesia Plan      TIVA     ASA 3       Induction: intravenous. Anesthetic plan and risks discussed with patient.                         Jose Damon MD   2/24/2023

## 2023-02-24 NOTE — DISCHARGE INSTRUCTIONS
Gastrointestinal Esophagogastroduodenoscopy (EGD)/ Endoscopic Ultrasound(EUS)- Upper Exam Discharge Instructions    1. Call Dr. Nichole Crowell at [phone]  for any problems or questions. 2. Contact the doctor's office for follow up appointment as directed. 3. Medication may cause drowsiness for several hours, therefore, do not drive or operate machinery for remainder of the day. 4. No alcohol today. 5. Do not make any important decisions such as signing legal paperwork. 6. Ordinarily, you may resume regular diet and activity after exam unless otherwise specified by your physician. 7. For mild soreness in your throat you may use Cepacol throat lozenges or warm  salt-water gargles as needed. Gastrointestinal Colonoscopy/Flexible Sigmoidoscopy - Lower Exam Discharge Instructions    Contact the doctors office for follow up appointment as directed  Medication may cause drowsiness for several hours, therefore, do not drive or operate machinery for remainder of the day. Ordinarily, you may resume regular diet and activity after exam unless otherwise specified by your physician. Because of air put into your colon during exam, you may experience some abdominal distension, relieved by the passage of gas, for several hours. Contact your physician if you have any of the following:  Excessive amount of bleeding - large amount when having a bowel movement. Occasional specks of blood with bowel movement would not be unusual.  Severe abdominal pain  Fever or Chills      Any additional instructions:     PLAN:  1. Follow up pathology with office in 2-3 weeks. 2. If adenoma, will plan on repeating colonoscopy in 5 years  3.  Clinic follow up with Dr. Rakesh Edmond in 2-3 months

## 2023-02-24 NOTE — PROGRESS NOTES
RN reviewed discharge instructions with patient and son Gali Dawson. Patient and son verbalized understanding. Copy of discharge instructions sent home with patient along with all patient belongings.

## 2023-02-24 NOTE — OP NOTE
ESOPHAGOGASTRODUODENOSCOPY        DATE of PROCEDURE: 2/24/2023    PT NAME: Arnie Oliva  [id number]    PHYSICIAN:  Law Sparks MD    MEDICATION:  MAC    INSTRUMENT: GIFQ    PROCEDURE:  EGD with biopsies, EGD diagnostic    INDICATIONS: Hx of Gastric Polyp with Low Grade Dysplasia    FINDINGS:  OROPHARYNX: Cords and pyriform recesses normal.  ESOPHAGUS: The esophagus is normal. The proximal, mid and distal portions are normal. The Z-Line is intact. GEJ is located at about 37 cm from the incisors. STOMACH: The fundus on antegrade and retroflex views shows a 2 cm hiatal hernia. The body is normal.  There is a 4 mm flat polyp in the antrum which was removed using cold forceps and sent for histology. DUODENUM: The bulb and second portions are normal.    ASSESSMENT:  1. Gastric Polyp  2. Hiatal Hernia    PLAN:  1. Follow up pathology  2.  Proceed with colonoscopy    Law Sparks MD  [facility]

## 2023-06-01 ENCOUNTER — APPOINTMENT (RX ONLY)
Dept: URBAN - METROPOLITAN AREA CLINIC 330 | Facility: CLINIC | Age: 68
Setting detail: DERMATOLOGY
End: 2023-06-01

## 2023-06-01 DIAGNOSIS — L43.8 OTHER LICHEN PLANUS: ICD-10-CM | Status: INADEQUATELY CONTROLLED

## 2023-06-01 DIAGNOSIS — L28.1 PRURIGO NODULARIS: ICD-10-CM | Status: INADEQUATELY CONTROLLED

## 2023-06-01 PROCEDURE — ? PRESCRIPTION MEDICATION MANAGEMENT

## 2023-06-01 PROCEDURE — 99214 OFFICE O/P EST MOD 30 MIN: CPT

## 2023-06-01 PROCEDURE — ? COUNSELING

## 2023-06-01 PROCEDURE — ? FULL BODY SKIN EXAM - DECLINED

## 2023-06-01 PROCEDURE — ? PRESCRIPTION

## 2023-06-01 PROCEDURE — ? OTHER

## 2023-06-01 RX ORDER — FLUTICASONE PROPIONATE 0.05 MG/G
OINTMENT TOPICAL
Qty: 60 | Refills: 2 | Status: ERX | COMMUNITY
Start: 2023-06-01

## 2023-06-01 RX ADMIN — FLUTICASONE PROPIONATE: 0.05 OINTMENT TOPICAL at 00:00

## 2023-06-01 ASSESSMENT — LOCATION ZONE DERM
LOCATION ZONE: ARM
LOCATION ZONE: TRUNK
LOCATION ZONE: LEG

## 2023-06-01 ASSESSMENT — LOCATION DETAILED DESCRIPTION DERM
LOCATION DETAILED: LEFT DISTAL POSTERIOR UPPER ARM
LOCATION DETAILED: LEFT PROXIMAL PRETIBIAL REGION
LOCATION DETAILED: RIGHT BUTTOCK
LOCATION DETAILED: RIGHT DISTAL PRETIBIAL REGION
LOCATION DETAILED: LEFT DISTAL PRETIBIAL REGION
LOCATION DETAILED: RIGHT DISTAL POSTERIOR UPPER ARM

## 2023-06-01 ASSESSMENT — LOCATION SIMPLE DESCRIPTION DERM
LOCATION SIMPLE: RIGHT BUTTOCK
LOCATION SIMPLE: LEFT PRETIBIAL REGION
LOCATION SIMPLE: LEFT POSTERIOR UPPER ARM
LOCATION SIMPLE: RIGHT PRETIBIAL REGION
LOCATION SIMPLE: RIGHT POSTERIOR UPPER ARM

## 2023-06-01 ASSESSMENT — ITCH INTENSITY: HOW SEVERE IS YOUR ITCHING?: 1

## 2023-06-01 ASSESSMENT — BSA RASH: BSA RASH: 10

## 2023-06-01 NOTE — PROCEDURE: PRESCRIPTION MEDICATION MANAGEMENT
Render In Strict Bullet Format?: No
Detail Level: Zone
Continue Regimen: Fluticasone oint apply twice daily for two weeks and once daily for the 3rd week

## 2023-06-01 NOTE — PROCEDURE: MIPS QUALITY
Quality 110: Preventive Care And Screening: Influenza Immunization: Influenza Immunization Administered during Influenza season
Detail Level: Detailed
Quality 431: Preventive Care And Screening: Unhealthy Alcohol Use - Screening: Patient not identified as an unhealthy alcohol user when screened for unhealthy alcohol use using a systematic screening method
Quality 47: Advance Care Plan: Advance care planning not documented, reason not otherwise specified.
Quality 130: Documentation Of Current Medications In The Medical Record: Current Medications Documented
Quality 402: Tobacco Use And Help With Quitting Among Adolescents: Patient screened for tobacco and never smoked
Quality 226: Preventive Care And Screening: Tobacco Use: Screening And Cessation Intervention: Patient screened for tobacco use and is an ex/non-smoker

## 2023-06-23 ENCOUNTER — HOSPITAL ENCOUNTER (EMERGENCY)
Age: 68
Discharge: HOME | End: 2023-06-23
Attending: EMERGENCY MEDICINE
Payer: MEDICARE

## 2023-06-23 ENCOUNTER — APPOINTMENT (OUTPATIENT)
Dept: GENERAL RADIOLOGY | Age: 68
End: 2023-06-23
Payer: MEDICARE

## 2023-06-23 VITALS
HEART RATE: 89 BPM | RESPIRATION RATE: 18 BRPM | SYSTOLIC BLOOD PRESSURE: 150 MMHG | HEIGHT: 65 IN | WEIGHT: 262 LBS | TEMPERATURE: 98.8 F | OXYGEN SATURATION: 100 % | DIASTOLIC BLOOD PRESSURE: 104 MMHG | BODY MASS INDEX: 43.65 KG/M2

## 2023-06-23 DIAGNOSIS — M25.512 ACUTE PAIN OF LEFT SHOULDER: ICD-10-CM

## 2023-06-23 DIAGNOSIS — S92.512B OPEN DISPLACED FRACTURE OF PROXIMAL PHALANX OF LESSER TOE OF LEFT FOOT, INITIAL ENCOUNTER: Primary | ICD-10-CM

## 2023-06-23 DIAGNOSIS — M25.562 ACUTE PAIN OF LEFT KNEE: ICD-10-CM

## 2023-06-23 DIAGNOSIS — S91.115A LACERATION OF LESSER TOE OF LEFT FOOT WITHOUT FOREIGN BODY PRESENT OR DAMAGE TO NAIL, INITIAL ENCOUNTER: ICD-10-CM

## 2023-06-23 PROCEDURE — 12041 INTMD RPR N-HF/GENIT 2.5CM/<: CPT

## 2023-06-23 PROCEDURE — 2500000003 HC RX 250 WO HCPCS

## 2023-06-23 PROCEDURE — 73562 X-RAY EXAM OF KNEE 3: CPT

## 2023-06-23 PROCEDURE — 99283 EMERGENCY DEPT VISIT LOW MDM: CPT

## 2023-06-23 PROCEDURE — 73630 X-RAY EXAM OF FOOT: CPT

## 2023-06-23 RX ORDER — HYDROCODONE BITARTRATE AND ACETAMINOPHEN 5; 325 MG/1; MG/1
1 TABLET ORAL EVERY 8 HOURS PRN
Qty: 10 TABLET | Refills: 0 | Status: SHIPPED | OUTPATIENT
Start: 2023-06-23 | End: 2023-06-28

## 2023-06-23 RX ORDER — LIDOCAINE HYDROCHLORIDE 10 MG/ML
5 INJECTION, SOLUTION INFILTRATION; PERINEURAL ONCE
Status: COMPLETED | OUTPATIENT
Start: 2023-06-23 | End: 2023-06-23

## 2023-06-23 RX ADMIN — LIDOCAINE HYDROCHLORIDE 5 ML: 10 INJECTION, SOLUTION INFILTRATION; PERINEURAL at 22:49

## 2023-06-23 ASSESSMENT — ENCOUNTER SYMPTOMS
RESPIRATORY NEGATIVE: 1
BACK PAIN: 0
GASTROINTESTINAL NEGATIVE: 1

## 2023-06-23 ASSESSMENT — PAIN SCALES - GENERAL: PAINLEVEL_OUTOF10: 8

## 2023-06-23 ASSESSMENT — PAIN DESCRIPTION - ORIENTATION: ORIENTATION: LEFT

## 2023-06-23 ASSESSMENT — PAIN - FUNCTIONAL ASSESSMENT: PAIN_FUNCTIONAL_ASSESSMENT: 0-10

## 2023-06-23 ASSESSMENT — LIFESTYLE VARIABLES
HOW OFTEN DO YOU HAVE A DRINK CONTAINING ALCOHOL: NEVER
HOW MANY STANDARD DRINKS CONTAINING ALCOHOL DO YOU HAVE ON A TYPICAL DAY: PATIENT DOES NOT DRINK

## 2023-06-23 ASSESSMENT — PAIN DESCRIPTION - LOCATION: LOCATION: FOOT

## 2023-06-23 NOTE — ED TRIAGE NOTES
Per patient trip/fall 1 hour prior to arrival with walker. States of left knee pain and laceration to left #3 and #4 toe. Additional complaints include to r shoulder pain.

## 2023-06-24 NOTE — DISCHARGE INSTRUCTIONS
Follow-up with orthopedics. Stop your Ultram for now. Take Norco as needed for pain over the next 3 days. Return for suture removal in 10 days. If the orthopedics or your primary care doctor can do it that is also fine. Continue your Keflex for the next 7 days as currently prescribed. Good wound care to the area of laceration. Return immediately if signs of infection. Change dressing twice a day. Apply antibiotic ointment to the wound.

## 2023-06-24 NOTE — ED PROVIDER NOTES
See note from attending, Dr. Demar Sheehan for patient's full ER note. I only performed lack repair per Dr. Jay Ruano request.    Procedures     Lac Repair    Date/Time: 6/23/2023 8:38 PM  Performed by: TIFFANIE Foster  Authorized by: Lisandro An MD     Consent:     Consent obtained:  Verbal    Consent given by:  Patient    Risks, benefits, and alternatives were discussed: yes      Risks discussed:  Infection, need for additional repair, nerve damage, poor wound healing, poor cosmetic result, pain, retained foreign body, tendon damage and vascular damage  Universal protocol:     Procedure explained and questions answered to patient or proxy's satisfaction: yes      Imaging studies available: yes      Patient identity confirmed:  Verbally with patient  Anesthesia:     Anesthesia method:  Local infiltration    Local anesthetic:  Lidocaine 1% w/o epi  Laceration details:     Location:  Foot    Foot location:  Sole of L foot    Wound length (cm): 3 separate lacerations to toes 3 4 and 5 at MTP joints. Laceration depth: Saturations ranging between 3 mm and 5 mm.   Pre-procedure details:     Preparation:  Patient was prepped and draped in usual sterile fashion and imaging obtained to evaluate for foreign bodies  Exploration:     Limited defect created (wound extended): no      Hemostasis achieved with:  Direct pressure    Imaging obtained: x-ray      Imaging outcome: foreign body not noted      Wound exploration: wound explored through full range of motion and entire depth of wound visualized      Wound extent: areolar tissue violated and underlying fracture      Wound extent: no fascia violation noted, no foreign bodies/material noted, no muscle damage noted, no nerve damage noted, no tendon damage noted and no vascular damage noted      Contaminated: no    Treatment:     Area cleansed with:  Povidone-iodine    Amount of cleaning:  Extensive    Irrigation solution:  Sterile water    Irrigation volume:  2500cc    Irrigation
[facility] [employer] Low Top, Left; MD (M7-10/F8-11)        Medications   lidocaine 1 % injection 5 mL (has no administration in time range)       New Prescriptions    HYDROCODONE-ACETAMINOPHEN (NORCO) 5-325 MG PER TABLET    Take 1 tablet by mouth every 8 hours as needed for Pain for up to 5 days. Intended supply: 3 days. Take lowest dose possible to manage pain Max Daily Amount: 3 tablets        Past Medical History:   Diagnosis Date    Acid reflux     Diabetes mellitus (HCC)     Trulicity- avg fasting 109-568; no hypo symptoms    Hiatal hernia     History of breast cancer     History of cardiac murmur     patient states was told had a \"small murmur many years ago\". States has not been heard since. Per last PCP note 11/25/22, heart sounds normal.    History of hepatitis C     History of staph infection     History of stomach ulcers     Hypertension     Sleep apnea     does not use a CPAP        Past Surgical History:   Procedure Laterality Date    BREAST LUMPECTOMY      CARPAL TUNNEL RELEASE      COLONOSCOPY      COLONOSCOPY N/A 2/24/2023    COLONOSCOPY POLYPECTOMY COLD BIOPSY performed by Jabari Urena MD at [address] [place] [address]      temporary for 10 weeks at age 14 d/t IUD infection    CYST REMOVAL      from ovaries    ESOPHAGOGASTRODUODENOSCOPY      HYSTERECTOMY (CERVIX STATUS UNKNOWN)      LIVER BIOPSY  2003    LYMPH NODE BIOPSY  11/08/2021    OVARY REMOVAL      PORT SURGERY  11/18/2021    TONSILLECTOMY      UPPER GASTROINTESTINAL ENDOSCOPY N/A 2/24/2023    EGD BIOPSY performed by Jabari Urena MD at [facility] ENDOSCOPY        Social History     Socioeconomic History    Marital status: Legally    Tobacco Use    Smoking status: Never    Smokeless tobacco: Never   Substance and Sexual Activity    Alcohol use: Not Currently    Drug use: Not Currently        Previous Medications    DOXEPIN (SINEQUAN) 10 MG CAPSULE    Take 10 mg by

## 2023-06-24 NOTE — ED NOTES
I have reviewed discharge instructions with the patient. The patient verbalized understanding. Patient left ED via Discharge Method: wheelchair to Home with family. Opportunity for questions and clarification provided. Patient given 1 scripts. To continue your aftercare when you leave the hospital, you may receive an automated call from our care team to check in on how you are doing. This is a free service and part of our promise to provide the best care and service to meet your aftercare needs.  If you have questions, or wish to unsubscribe from this service please call [phone]. Thank you for Choosing our [facility] Department.         Lidya Jaramillo RN  06/23/23 5754

## 2023-06-26 ENCOUNTER — OFFICE VISIT (OUTPATIENT)
Dept: ORTHOPEDIC SURGERY | Age: 68
End: 2023-06-26
Payer: MEDICARE

## 2023-06-26 VITALS — HEIGHT: 65 IN | BODY MASS INDEX: 43.65 KG/M2 | WEIGHT: 262 LBS

## 2023-06-26 DIAGNOSIS — S92.512B OPEN DISPLACED FRACTURE OF PROXIMAL PHALANX OF LESSER TOE OF LEFT FOOT, INITIAL ENCOUNTER: Primary | ICD-10-CM

## 2023-06-26 DIAGNOSIS — S99.922A INJURY OF LEFT FOOT, INITIAL ENCOUNTER: ICD-10-CM

## 2023-06-26 DIAGNOSIS — S91.312A LACERATION OF LEFT FOOT, INITIAL ENCOUNTER: ICD-10-CM

## 2023-06-26 PROCEDURE — 99204 OFFICE O/P NEW MOD 45 MIN: CPT | Performed by: ORTHOPAEDIC SURGERY

## 2023-06-26 PROCEDURE — 1123F ACP DISCUSS/DSCN MKR DOCD: CPT | Performed by: ORTHOPAEDIC SURGERY

## 2023-07-03 ENCOUNTER — TELEPHONE (OUTPATIENT)
Dept: ORTHOPEDIC SURGERY | Age: 68
End: 2023-07-03

## 2023-07-03 NOTE — TELEPHONE ENCOUNTER
Called and left message for pt about cx her appt with Dr. Jorge Green on 07/05/23. We will call pt back about when we can r/s her.

## 2023-07-06 ENCOUNTER — OFFICE VISIT (OUTPATIENT)
Dept: ORTHOPEDIC SURGERY | Age: 68
End: 2023-07-06
Payer: MEDICARE

## 2023-07-06 DIAGNOSIS — S99.922A INJURY OF LEFT FOOT, INITIAL ENCOUNTER: ICD-10-CM

## 2023-07-06 DIAGNOSIS — S92.512B OPEN DISPLACED FRACTURE OF PROXIMAL PHALANX OF LESSER TOE OF LEFT FOOT, INITIAL ENCOUNTER: Primary | ICD-10-CM

## 2023-07-06 PROCEDURE — 99213 OFFICE O/P EST LOW 20 MIN: CPT | Performed by: NURSE PRACTITIONER

## 2023-07-06 PROCEDURE — 1123F ACP DISCUSS/DSCN MKR DOCD: CPT | Performed by: NURSE PRACTITIONER

## 2023-07-06 NOTE — PROGRESS NOTES
fractures    Impression: Healing fourth and fifth proximal phalangeal fractures. Promise Agustina, APRN - CNP           Assessment:   Left plantar 3-5 toe healing lacerations  with 4-5 toe proximal phalangeal fractures. Treatment Plan:   3 This is stable chronic illness/condition  Treatment at this time:  Sutures were removed today, patient may now get the affected extremity wet. She was encouraged to soak with Epsom salts few times a week to help with additional healing of the lacerations. She will continue to wear what ever she was comfortable for her if she is not wearing any type of assistive device at this time to help. Due to her lack of pain today I did not feel a carbon fiber plate was necessary, although this could be an option for her in the future. She will follow-up in approximately 6 weeks or sooner if needed with Dr. Ariel Starkey. Studies ordered:  Foot XR needed @ Next Visit    Weight-bearing status: WBAT        Return to work/work restrictions: none  No medications given

## 2023-07-13 ENCOUNTER — APPOINTMENT (RX ONLY)
Dept: URBAN - METROPOLITAN AREA CLINIC 330 | Facility: CLINIC | Age: 68
Setting detail: DERMATOLOGY
End: 2023-07-13

## 2023-07-13 ENCOUNTER — RX ONLY (OUTPATIENT)
Age: 68
Setting detail: RX ONLY
End: 2023-07-13

## 2023-07-13 DIAGNOSIS — L28.1 PRURIGO NODULARIS: ICD-10-CM | Status: IMPROVED

## 2023-07-13 DIAGNOSIS — L43.8 OTHER LICHEN PLANUS: ICD-10-CM | Status: IMPROVED

## 2023-07-13 DIAGNOSIS — L65.9 NONSCARRING HAIR LOSS, UNSPECIFIED: ICD-10-CM | Status: STABLE

## 2023-07-13 PROCEDURE — ? OTHER

## 2023-07-13 PROCEDURE — ? COUNSELING

## 2023-07-13 PROCEDURE — ? ADDITIONAL NOTES

## 2023-07-13 PROCEDURE — ? PRESCRIPTION MEDICATION MANAGEMENT

## 2023-07-13 PROCEDURE — 99214 OFFICE O/P EST MOD 30 MIN: CPT

## 2023-07-13 PROCEDURE — ? FULL BODY SKIN EXAM - DECLINED

## 2023-07-13 RX ORDER — FLUTICASONE PROPIONATE 0.05 MG/G
OINTMENT TOPICAL
Qty: 60 | Refills: 3 | Status: ERX

## 2023-07-13 ASSESSMENT — LOCATION DETAILED DESCRIPTION DERM
LOCATION DETAILED: LEFT DISTAL POSTERIOR UPPER ARM
LOCATION DETAILED: LEFT PROXIMAL PRETIBIAL REGION
LOCATION DETAILED: RIGHT BUTTOCK
LOCATION DETAILED: RIGHT DISTAL POSTERIOR UPPER ARM
LOCATION DETAILED: RIGHT DISTAL PRETIBIAL REGION
LOCATION DETAILED: LEFT DISTAL PRETIBIAL REGION
LOCATION DETAILED: LEFT MEDIAL FRONTAL SCALP

## 2023-07-13 ASSESSMENT — LOCATION SIMPLE DESCRIPTION DERM
LOCATION SIMPLE: RIGHT PRETIBIAL REGION
LOCATION SIMPLE: LEFT POSTERIOR UPPER ARM
LOCATION SIMPLE: LEFT SCALP
LOCATION SIMPLE: RIGHT BUTTOCK
LOCATION SIMPLE: LEFT PRETIBIAL REGION
LOCATION SIMPLE: RIGHT POSTERIOR UPPER ARM

## 2023-07-13 ASSESSMENT — LOCATION ZONE DERM
LOCATION ZONE: LEG
LOCATION ZONE: SCALP
LOCATION ZONE: TRUNK
LOCATION ZONE: ARM

## 2023-07-13 NOTE — PROCEDURE: FULL BODY SKIN EXAM - DECLINED
08-Nov-2021
Price (Do Not Change): 0.00
Detail Level: Simple
Instructions: This plan will send the code FBSD to the PM system.  DO NOT or CHANGE the price.

## 2023-07-13 NOTE — PROCEDURE: OTHER
Other (Free Text): Patient will apply fluticasone every other day as needed\\n\\nIf she flares, she may restart taper of BID x 2 weeks, QD x 2 weeks, then QOD x 2 weeks.
Detail Level: Zone
Note Text (......Xxx Chief Complaint.): This diagnosis correlates with the
Render Risk Assessment In Note?: yes

## 2023-07-13 NOTE — PROCEDURE: PRESCRIPTION MEDICATION MANAGEMENT
Render In Strict Bullet Format?: No
Detail Level: Zone
Continue Regimen: Fluticasone oint apply every other day.

## 2023-07-13 NOTE — PROCEDURE: ADDITIONAL NOTES
Render Risk Assessment In Note?: no
Detail Level: Simple
Additional Notes: Recommended minoxidil 5% bid. \\nPatient states that she previously had ILK injections in her scalp for licen planus. No evidence of that today. Her scalp is not itching or bothering her.

## 2023-08-17 ENCOUNTER — OFFICE VISIT (OUTPATIENT)
Dept: ORTHOPEDIC SURGERY | Age: 68
End: 2023-08-17
Payer: MEDICARE

## 2023-08-17 VITALS — HEIGHT: 65 IN | BODY MASS INDEX: 43.65 KG/M2 | WEIGHT: 262 LBS

## 2023-08-17 DIAGNOSIS — S99.922D INJURY OF LEFT FOOT, SUBSEQUENT ENCOUNTER: ICD-10-CM

## 2023-08-17 DIAGNOSIS — S91.312S LACERATION OF LEFT FOOT, SEQUELA: ICD-10-CM

## 2023-08-17 DIAGNOSIS — S92.512G: Primary | ICD-10-CM

## 2023-08-17 PROCEDURE — 1123F ACP DISCUSS/DSCN MKR DOCD: CPT | Performed by: ORTHOPAEDIC SURGERY

## 2023-08-17 PROCEDURE — 99213 OFFICE O/P EST LOW 20 MIN: CPT | Performed by: ORTHOPAEDIC SURGERY

## 2023-08-17 NOTE — PROGRESS NOTES
Name: Aureliano Molina  YOB: 1955  Gender: female  MRN: [id number]    07/06/2023: Ms. LAM Solomon was kind enough to see the patient in my absence for suture removal and evaluation with healing fourth and fifth phalangeal fractures. 08/17/2023: Returns in regular shoes doing well    HPI:   06/23/2023: Left foot injury:  06/23/2023: [facility] ED suture laceration  06/26/2023: Initial visit: Left foot injury    ROS/Meds/PSH/PMH/FH/SH: reviewed today    Tobacco:  reports that she has never smoked. She has never used smokeless tobacco.     Physical Examination:  Patient appears to be alert and oriented with acceptable appearance. No obvious distress or SOB  CV: appears to have acceptable vascular color and capillary refill  Neuro: appears to have mostly intact light touch sensation   Skin: Healed; no maceration redness or open lesions; expected toes swelling/thickening  MS: She is full weightbearing in regular shoes  Left = 4-5 toe tenderness; no metatarsal pain; no midfoot, hindfoot, ankle pain  Left = no loss of motion or motor    XR: Left: Standing AP lateral oblique foot taken today with healing 4-5 toe proximal phalangeal fracture  XR Impression:  As above      Reviewed Test/Records/Documents:   06/23/2023: [facility] ED: Reflects laceration after a fall: ER sutures for the laceration 3-5 based toes. Reflected being on Keflex 4 times a day for a prior issue. 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

## 2024-03-28 ENCOUNTER — APPOINTMENT (RX ONLY)
Dept: URBAN - METROPOLITAN AREA CLINIC 330 | Facility: CLINIC | Age: 69
Setting detail: DERMATOLOGY
End: 2024-03-28

## 2024-03-28 DIAGNOSIS — L28.0 LICHEN SIMPLEX CHRONICUS: ICD-10-CM

## 2024-03-28 DIAGNOSIS — L43.8 OTHER LICHEN PLANUS: ICD-10-CM

## 2024-03-28 DIAGNOSIS — L81.4 OTHER MELANIN HYPERPIGMENTATION: ICD-10-CM

## 2024-03-28 PROCEDURE — ? PRESCRIPTION MEDICATION MANAGEMENT

## 2024-03-28 PROCEDURE — ? PRESCRIPTION

## 2024-03-28 PROCEDURE — ? COUNSELING

## 2024-03-28 PROCEDURE — 99214 OFFICE O/P EST MOD 30 MIN: CPT

## 2024-03-28 RX ORDER — CLOBETASOL PROPIONATE 0.5 MG/G
CREAM TOPICAL BID
Qty: 120 | Refills: 5 | Status: ERX | COMMUNITY
Start: 2024-03-28

## 2024-03-28 RX ADMIN — CLOBETASOL PROPIONATE: 0.5 CREAM TOPICAL at 00:00

## 2024-03-28 ASSESSMENT — LOCATION SIMPLE DESCRIPTION DERM
LOCATION SIMPLE: LEFT PRETIBIAL REGION
LOCATION SIMPLE: RIGHT ANKLE
LOCATION SIMPLE: RIGHT FOREARM
LOCATION SIMPLE: LEFT POSTERIOR UPPER ARM
LOCATION SIMPLE: RIGHT PRETIBIAL REGION
LOCATION SIMPLE: LEFT ANKLE

## 2024-03-28 ASSESSMENT — LOCATION DETAILED DESCRIPTION DERM
LOCATION DETAILED: LEFT ANKLE
LOCATION DETAILED: LEFT PROXIMAL POSTERIOR UPPER ARM
LOCATION DETAILED: RIGHT DISTAL RADIAL DORSAL FOREARM
LOCATION DETAILED: RIGHT ANKLE
LOCATION DETAILED: RIGHT DISTAL PRETIBIAL REGION
LOCATION DETAILED: LEFT DISTAL PRETIBIAL REGION

## 2024-03-28 ASSESSMENT — LOCATION ZONE DERM
LOCATION ZONE: ARM
LOCATION ZONE: LEG

## 2024-03-28 ASSESSMENT — SEVERITY ASSESSMENT
SEVERITY: MILD
SEVERITY: MILD TO MODERATE

## 2024-03-28 NOTE — PROCEDURE: PRESCRIPTION MEDICATION MANAGEMENT
Initiate Treatment: Apply to discoloration twice a day for two weeks, then once a day for two weeks, then every other day for two weeks
Detail Level: Simple
Render In Strict Bullet Format?: No
Plan: Fluticasone doesn’t come in large enough quantity. Will change out to clobetasol for 60 gm tube.
Initiate Treatment: Clobetasol cream
Detail Level: Zone
Samples Given: Eucerin moisturizer roughness relief

## 2024-08-02 ENCOUNTER — APPOINTMENT (RX ONLY)
Dept: URBAN - METROPOLITAN AREA CLINIC 330 | Facility: CLINIC | Age: 69
Setting detail: DERMATOLOGY
End: 2024-08-02

## 2024-08-02 DIAGNOSIS — L81.4 OTHER MELANIN HYPERPIGMENTATION: ICD-10-CM | Status: STABLE

## 2024-08-02 DIAGNOSIS — L28.0 LICHEN SIMPLEX CHRONICUS: ICD-10-CM | Status: STABLE

## 2024-08-02 DIAGNOSIS — L43.8 OTHER LICHEN PLANUS: ICD-10-CM | Status: STABLE

## 2024-08-02 PROCEDURE — ? OTC TREATMENT REGIMEN

## 2024-08-02 PROCEDURE — ? PRESCRIPTION MEDICATION MANAGEMENT

## 2024-08-02 PROCEDURE — 99214 OFFICE O/P EST MOD 30 MIN: CPT

## 2024-08-02 PROCEDURE — ? COUNSELING

## 2024-08-02 ASSESSMENT — LOCATION DETAILED DESCRIPTION DERM
LOCATION DETAILED: LEFT ANKLE
LOCATION DETAILED: RIGHT DISTAL PRETIBIAL REGION
LOCATION DETAILED: RIGHT ANKLE
LOCATION DETAILED: LEFT PROXIMAL POSTERIOR UPPER ARM
LOCATION DETAILED: RIGHT DISTAL RADIAL DORSAL FOREARM
LOCATION DETAILED: LEFT DISTAL PRETIBIAL REGION

## 2024-08-02 ASSESSMENT — LOCATION ZONE DERM
LOCATION ZONE: LEG
LOCATION ZONE: ARM

## 2024-08-02 ASSESSMENT — LOCATION SIMPLE DESCRIPTION DERM
LOCATION SIMPLE: LEFT PRETIBIAL REGION
LOCATION SIMPLE: RIGHT ANKLE
LOCATION SIMPLE: RIGHT FOREARM
LOCATION SIMPLE: LEFT POSTERIOR UPPER ARM
LOCATION SIMPLE: LEFT ANKLE
LOCATION SIMPLE: RIGHT PRETIBIAL REGION

## 2024-08-02 NOTE — PROCEDURE: OTC TREATMENT REGIMEN
Patient Specific Otc Recommendations (Will Not Stick From Patient To Patient): Recommended using Eucerin moisturizer.
Detail Level: Zone

## 2024-08-02 NOTE — PROCEDURE: PRESCRIPTION MEDICATION MANAGEMENT
Plan: Pt state’s improvement with Clobetasol cream, she mentions she does not take care of her legs as she should and does pick.\\nRecommended continuing Clobetasol cream, moisturizing and not to pick.
Render In Strict Bullet Format?: No
Continue Regimen: Clobetasol cream
Detail Level: Zone

## 2024-08-02 NOTE — PROCEDURE: MIPS QUALITY
Quality 226: Preventive Care And Screening: Tobacco Use: Screening And Cessation Intervention: Patient screened for tobacco use and is an ex/non-smoker
Quality 402: Tobacco Use And Help With Quitting Among Adolescents: Patient screened for tobacco and never smoked
Quality 130: Documentation Of Current Medications In The Medical Record: Current Medications Documented
Detail Level: Detailed
Quality 47: Advance Care Plan: Advance care planning not documented, reason not otherwise specified.
Quality 431: Preventive Care And Screening: Unhealthy Alcohol Use - Screening: Patient not identified as an unhealthy alcohol user when screened for unhealthy alcohol use using a systematic screening method

## 2025-08-07 ENCOUNTER — RX ONLY (RX ONLY)
Age: 70
End: 2025-08-07

## 2025-08-07 ENCOUNTER — APPOINTMENT (OUTPATIENT)
Dept: URBAN - METROPOLITAN AREA CLINIC 330 | Facility: CLINIC | Age: 70
Setting detail: DERMATOLOGY
End: 2025-08-07

## 2025-08-07 DIAGNOSIS — L81.4 OTHER MELANIN HYPERPIGMENTATION: ICD-10-CM

## 2025-08-07 DIAGNOSIS — L81.1 CHLOASMA: ICD-10-CM | Status: INADEQUATELY CONTROLLED

## 2025-08-07 DIAGNOSIS — L28.0 LICHEN SIMPLEX CHRONICUS: ICD-10-CM

## 2025-08-07 DIAGNOSIS — L43.8 OTHER LICHEN PLANUS: ICD-10-CM | Status: STABLE

## 2025-08-07 DIAGNOSIS — L28.1 PRURIGO NODULARIS: ICD-10-CM | Status: STABLE

## 2025-08-07 PROCEDURE — ? PRESCRIPTION

## 2025-08-07 PROCEDURE — ? PRESCRIPTION MEDICATION MANAGEMENT

## 2025-08-07 PROCEDURE — ? OTC TREATMENT REGIMEN

## 2025-08-07 PROCEDURE — ? FULL BODY SKIN EXAM - DECLINED

## 2025-08-07 PROCEDURE — ? COUNSELING

## 2025-08-07 PROCEDURE — ? PHOTO-DOCUMENTATION

## 2025-08-07 RX ORDER — PHARMACY COMPOUNDING ACCESSORY
EACH MISCELLANEOUS
Qty: 30 | Refills: 2 | Status: CANCELLED | COMMUNITY
Start: 2025-08-07

## 2025-08-07 RX ORDER — PHARMACY COMPOUNDING ACCESSORY
EACH MISCELLANEOUS
Qty: 30 | Refills: 2 | Status: ERX

## 2025-08-07 RX ADMIN — Medication: at 00:00

## 2025-08-07 ASSESSMENT — LOCATION ZONE DERM
LOCATION ZONE: ARM
LOCATION ZONE: FEET
LOCATION ZONE: FACE
LOCATION ZONE: LEG

## 2025-08-07 ASSESSMENT — LOCATION SIMPLE DESCRIPTION DERM
LOCATION SIMPLE: LEFT ELBOW
LOCATION SIMPLE: RIGHT FOREARM
LOCATION SIMPLE: RIGHT ANKLE
LOCATION SIMPLE: LEFT CHEEK
LOCATION SIMPLE: RIGHT ELBOW
LOCATION SIMPLE: LEFT ANKLE
LOCATION SIMPLE: LEFT PRETIBIAL REGION
LOCATION SIMPLE: LEFT POSTERIOR UPPER ARM
LOCATION SIMPLE: RIGHT PRETIBIAL REGION
LOCATION SIMPLE: RIGHT CHEEK
LOCATION SIMPLE: LEFT FOOT

## 2025-08-07 ASSESSMENT — LOCATION DETAILED DESCRIPTION DERM
LOCATION DETAILED: LEFT ELBOW
LOCATION DETAILED: RIGHT DISTAL PRETIBIAL REGION
LOCATION DETAILED: RIGHT ANKLE
LOCATION DETAILED: LEFT DORSAL FOOT
LOCATION DETAILED: LEFT PROXIMAL POSTERIOR UPPER ARM
LOCATION DETAILED: LEFT ANKLE
LOCATION DETAILED: RIGHT CENTRAL MALAR CHEEK
LOCATION DETAILED: RIGHT DISTAL RADIAL DORSAL FOREARM
LOCATION DETAILED: LEFT DISTAL PRETIBIAL REGION
LOCATION DETAILED: RIGHT ELBOW
LOCATION DETAILED: LEFT CENTRAL MALAR CHEEK

## 2025-08-07 ASSESSMENT — BSA RASH: BSA RASH: 2

## (undated) DEVICE — YANKAUER,BULB TIP,W/O VENT,RIGID,STERILE: Brand: MEDLINE

## (undated) DEVICE — CONNECTOR TBNG OD5-7MM O2 END DISP

## (undated) DEVICE — AIRLIFE™ OXYGEN TUBING 7 FEET (2.1 M) CRUSH RESISTANT OXYGEN TUBING, VINYL TIPPED: Brand: AIRLIFE™

## (undated) DEVICE — FORMALIN SOLUTION 20

## (undated) DEVICE — LUBE JELLY FOIL PACK 1.4 OZ: Brand: MEDLINE INDUSTRIES, INC.

## (undated) DEVICE — SYRINGE, LUER SLIP, STERILE, 60ML: Brand: MEDLINE

## (undated) DEVICE — NEEDLE SYR 18GA L1.5IN RED PLAS HUB S STL BLNT FILL W/O

## (undated) DEVICE — GAUZE,SPONGE,4"X4",12PLY,WOVEN,NS,LF: Brand: MEDLINE

## (undated) DEVICE — SYRINGE MED 3ML CLR PLAS STD N CTRL LUERLOCK TIP DISP

## (undated) DEVICE — SINGLE PORT MANIFOLD: Brand: NEPTUNE 2

## (undated) DEVICE — KENDALL RADIOLUCENT FOAM MONITORING ELECTRODE RECTANGULAR SHAPE: Brand: KENDALL

## (undated) DEVICE — CANNULA NSL ORAL AD FOR CAPNOFLEX CO2 O2 AIRLFE

## (undated) DEVICE — FORCEPS BX L240CM JAW DIA2.8MM L CAP W/ NDL MIC MESH TOOTH

## (undated) DEVICE — BLOCK BITE AD 60FR W/ VELC STRP ADDRESSES MOST PT AND

## (undated) DEVICE — SYRINGE MED 10ML LUERLOCK TIP W/O SFTY DISP